# Patient Record
Sex: MALE | Race: OTHER | HISPANIC OR LATINO | Employment: FULL TIME | URBAN - METROPOLITAN AREA
[De-identification: names, ages, dates, MRNs, and addresses within clinical notes are randomized per-mention and may not be internally consistent; named-entity substitution may affect disease eponyms.]

---

## 2024-10-23 ENCOUNTER — HOSPITAL ENCOUNTER (EMERGENCY)
Facility: HOSPITAL | Age: 39
Discharge: HOME | End: 2024-10-23
Attending: EMERGENCY MEDICINE
Payer: COMMERCIAL

## 2024-10-23 VITALS
DIASTOLIC BLOOD PRESSURE: 67 MMHG | SYSTOLIC BLOOD PRESSURE: 124 MMHG | OXYGEN SATURATION: 99 % | TEMPERATURE: 99 F | HEIGHT: 63 IN | WEIGHT: 195 LBS | BODY MASS INDEX: 34.55 KG/M2 | HEART RATE: 57 BPM | RESPIRATION RATE: 19 BRPM

## 2024-10-23 DIAGNOSIS — R09.1 PLEURISY: Primary | ICD-10-CM

## 2024-10-23 DIAGNOSIS — R07.9 CHEST PAIN: ICD-10-CM

## 2024-10-23 DIAGNOSIS — D64.9 ANEMIA, UNSPECIFIED TYPE: ICD-10-CM

## 2024-10-23 LAB
ALBUMIN SERPL BCP-MCNC: 3.8 G/DL (ref 3.5–5.2)
ALP SERPL-CCNC: 126 U/L (ref 40–150)
ALT SERPL W/O P-5'-P-CCNC: 30 U/L (ref 10–44)
ANION GAP SERPL CALC-SCNC: 10 MMOL/L (ref 8–16)
AST SERPL-CCNC: 21 U/L (ref 10–40)
BASOPHILS # BLD AUTO: 0.04 K/UL (ref 0–0.2)
BASOPHILS NFR BLD: 0.5 % (ref 0–1.9)
BILIRUB SERPL-MCNC: 0.3 MG/DL (ref 0.1–1)
BNP SERPL-MCNC: <10 PG/ML (ref 0–99)
BUN SERPL-MCNC: 14 MG/DL (ref 6–20)
CALCIUM SERPL-MCNC: 9.1 MG/DL (ref 8.7–10.5)
CHLORIDE SERPL-SCNC: 104 MMOL/L (ref 95–110)
CO2 SERPL-SCNC: 26 MMOL/L (ref 23–29)
CREAT SERPL-MCNC: 0.8 MG/DL (ref 0.5–1.4)
D DIMER PPP IA.FEU-MCNC: <0.19 MG/L FEU
DIFFERENTIAL METHOD BLD: ABNORMAL
EOSINOPHIL # BLD AUTO: 0.1 K/UL (ref 0–0.5)
EOSINOPHIL NFR BLD: 1.5 % (ref 0–8)
ERYTHROCYTE [DISTWIDTH] IN BLOOD BY AUTOMATED COUNT: 15 % (ref 11.5–14.5)
EST. GFR  (NO RACE VARIABLE): >60 ML/MIN/1.73 M^2
GLUCOSE SERPL-MCNC: 116 MG/DL (ref 70–110)
HCT VFR BLD AUTO: 37.6 % (ref 40–54)
HGB BLD-MCNC: 12.6 G/DL (ref 14–18)
IMM GRANULOCYTES # BLD AUTO: 0.04 K/UL (ref 0–0.04)
IMM GRANULOCYTES NFR BLD AUTO: 0.5 % (ref 0–0.5)
LYMPHOCYTES # BLD AUTO: 1.8 K/UL (ref 1–4.8)
LYMPHOCYTES NFR BLD: 22.4 % (ref 18–48)
MCH RBC QN AUTO: 28.4 PG (ref 27–31)
MCHC RBC AUTO-ENTMCNC: 33.5 G/DL (ref 32–36)
MCV RBC AUTO: 85 FL (ref 82–98)
MONOCYTES # BLD AUTO: 0.4 K/UL (ref 0.3–1)
MONOCYTES NFR BLD: 5.3 % (ref 4–15)
NEUTROPHILS # BLD AUTO: 5.6 K/UL (ref 1.8–7.7)
NEUTROPHILS NFR BLD: 69.8 % (ref 38–73)
NRBC BLD-RTO: 0 /100 WBC
PLATELET # BLD AUTO: 197 K/UL (ref 150–450)
PMV BLD AUTO: 10.3 FL (ref 9.2–12.9)
POTASSIUM SERPL-SCNC: 3.6 MMOL/L (ref 3.5–5.1)
PROT SERPL-MCNC: 7.1 G/DL (ref 6–8.4)
RBC # BLD AUTO: 4.44 M/UL (ref 4.6–6.2)
SODIUM SERPL-SCNC: 140 MMOL/L (ref 136–145)
TROPONIN I SERPL DL<=0.01 NG/ML-MCNC: 0.02 NG/ML (ref 0–0.03)
TROPONIN I SERPL DL<=0.01 NG/ML-MCNC: 0.02 NG/ML (ref 0–0.03)
WBC # BLD AUTO: 8.05 K/UL (ref 3.9–12.7)

## 2024-10-23 PROCEDURE — 80053 COMPREHEN METABOLIC PANEL: CPT | Mod: ER | Performed by: EMERGENCY MEDICINE

## 2024-10-23 PROCEDURE — 63600175 PHARM REV CODE 636 W HCPCS: Mod: ER | Performed by: EMERGENCY MEDICINE

## 2024-10-23 PROCEDURE — 96374 THER/PROPH/DIAG INJ IV PUSH: CPT | Mod: ER

## 2024-10-23 PROCEDURE — 84484 ASSAY OF TROPONIN QUANT: CPT | Mod: 91,ER | Performed by: EMERGENCY MEDICINE

## 2024-10-23 PROCEDURE — 85025 COMPLETE CBC W/AUTO DIFF WBC: CPT | Mod: ER | Performed by: EMERGENCY MEDICINE

## 2024-10-23 PROCEDURE — 85379 FIBRIN DEGRADATION QUANT: CPT | Mod: ER | Performed by: EMERGENCY MEDICINE

## 2024-10-23 PROCEDURE — 83880 ASSAY OF NATRIURETIC PEPTIDE: CPT | Mod: ER | Performed by: EMERGENCY MEDICINE

## 2024-10-23 PROCEDURE — 99285 EMERGENCY DEPT VISIT HI MDM: CPT | Mod: 25,ER

## 2024-10-23 PROCEDURE — 94761 N-INVAS EAR/PLS OXIMETRY MLT: CPT | Mod: ER

## 2024-10-23 RX ORDER — KETOROLAC TROMETHAMINE 30 MG/ML
30 INJECTION, SOLUTION INTRAMUSCULAR; INTRAVENOUS
Status: COMPLETED | OUTPATIENT
Start: 2024-10-23 | End: 2024-10-23

## 2024-10-23 RX ORDER — IBUPROFEN 600 MG/1
600 TABLET ORAL EVERY 6 HOURS PRN
Qty: 40 TABLET | Refills: 0 | Status: SHIPPED | OUTPATIENT
Start: 2024-10-23 | End: 2024-12-02

## 2024-10-23 RX ADMIN — KETOROLAC TROMETHAMINE 30 MG: 30 INJECTION, SOLUTION INTRAMUSCULAR at 03:10

## 2024-10-23 NOTE — ED PROVIDER NOTES
Emergency Medicine Provider Note - 10/23/2024       History     Chief Complaint   Patient presents with    Chest Pain     Pt is . Began having chest pain around 10 am while working. Pain is sharp on left side of chest, occurs about every 20 min, with a deep breath or with certain movements. Pain does not travel anywhere. Denies cardiac hx or hx of diabetes, htn. Does not taken any medication daily. Denies nausea, vomiting.        Allergies:  Review of patient's allergies indicates:  No Known Allergies     History of Present Illness   HPI    10/23/2024,   The history is provided by the patient    Language assistance with Cheo:  Usama #[id number]    Raymond Nguyen is a 39 y.o. male presenting to the ED for chest pain.  Patient reports that he started having chest pain in the left side of his chest.  It is described as sharp and stabbing.  It is worse when he takes a deep breath.  It is not associated with any nausea, vomiting, shortness of breath, diaphoresis.  It does not radiate to the arm neck or jaw.  Cardiac risk factors include:  Smoking.  Patient denies diabetes, hypertension, hyperlipidemia, coronary artery disease, history of DVT, or history of PE.  Patient denies any immobilization.  There has been no preceding cough, cold, congestion.  Patient denies any sore throat, nausea, vomiting, shortness of breath, cough, dysuria, urgency, frequency, hematuria, melena, hematochezia.      Arrival mode: Police/     PCP: No, Primary Doctor     Past Medical History:  History reviewed. No pertinent past medical history.    Past Surgical History:  Past Surgical History:   Procedure Laterality Date    HAND SURGERY Right          Family History:  No family history on file.    Social History:  Social History     Tobacco Use    Smoking status: Every Day     Types: Cigarettes    Smokeless tobacco: Never   Substance and Sexual Activity    Alcohol use: Not Currently    Drug use: Never    Sexual activity: Not on  file       The Past Medical History, Social History, Surgical History and Family History was reviewed as documented above.     Review of Systems   Review of Systems   Constitutional:  Negative for fever.   Respiratory:  Negative for cough and shortness of breath.    Cardiovascular:  Positive for chest pain (No radiation.   Worse with deep breath.). Negative for palpitations and leg swelling.   Gastrointestinal:  Negative for abdominal pain, blood in stool, nausea and vomiting.   Genitourinary:  Negative for dysuria.   Musculoskeletal:  Negative for back pain.   Neurological:  Negative for weakness.        Physical Exam     Initial Vitals   BP Pulse Resp Temp SpO2   10/23/24 1307 10/23/24 1307 10/23/24 1307 10/23/24 1309 10/23/24 1307   128/70 78 16 98.5 °F (36.9 °C) 98 %      MAP       --                 Physical Exam    Nursing Notes and Vital Signs Reviewed.  Constitutional: Patient is in no apparent distress. Well-developed and well-nourished.  Head: Atraumatic. Normocephalic.  Eyes: PERRL. EOM intact. Conjunctivae are not pale. No scleral icterus.  ENT: Mucous membranes are moist. Oropharynx is clear and symmetric.    Neck: Supple. Full ROM. No lymphadenopathy.  Cardiovascular: Regular rate. Regular rhythm. No murmurs, rubs, or gallops. Distal pulses are 2+ and symmetric.  Pulmonary/Chest: No respiratory distress. Clear to auscultation bilaterally. No wheezing or rales.  Abdominal: Soft and non-distended.  There is no tenderness.  No rebound, guarding, or rigidity. Good bowel sounds.  Musculoskeletal: Moves all extremities. No obvious deformities. No edema. No calf tenderness.  Genitourinary: N/A  Skin: Warm and dry.  Neurological:  Alert, awake, and appropriate.  Normal speech.  No acute focal neurological deficits are appreciated.  Psychiatric: Normal affect. Good eye contact. Appropriate in content.     ED Course   ED Procedures:  Procedures    ED Vital Signs:  Vitals:    10/23/24 1331 10/23/24 1332 10/23/24  1339 10/23/24 1342   BP:  117/67     Pulse: 70  72 67   Resp: 19   19   Temp:       TempSrc:       SpO2: 98%   99%   Weight:       Height:        10/23/24 1403 10/23/24 1431 10/23/24 1432 10/23/24 1503   BP: 118/70  121/69 (!) 118/56   Pulse: 68 62  62   Resp:  19     Temp:       TempSrc:       SpO2: 100% 98%  99%   Weight:       Height:        10/23/24 1504 10/23/24 1518 10/23/24 1533 10/23/24 1603   BP:   113/79 114/71   Pulse: 62 (!) 59 62 (!) 59   Resp: (!) 23 (!) 24  19   Temp:       TempSrc:       SpO2: 99% 100% 100% 96%   Weight:       Height:        10/23/24 1632 10/23/24 1633 10/23/24 1703   BP:  127/72 124/67   Pulse: 61 (!) 59 (!) 57   Resp: (!) 21  19   Temp:      TempSrc:      SpO2: 100% 100% 99%   Weight:      Height:          All Lab Results:  Results for orders placed or performed during the hospital encounter of 10/23/24   CBC auto differential    Collection Time: 10/23/24  2:02 PM   Result Value Ref Range    WBC 8.05 3.90 - 12.70 K/uL    RBC 4.44 (L) 4.60 - 6.20 M/uL    Hemoglobin 12.6 (L) 14.0 - 18.0 g/dL    Hematocrit 37.6 (L) 40.0 - 54.0 %    MCV 85 82 - 98 fL    MCH 28.4 27.0 - 31.0 pg    MCHC 33.5 32.0 - 36.0 g/dL    RDW 15.0 (H) 11.5 - 14.5 %    Platelets 197 150 - 450 K/uL    MPV 10.3 9.2 - 12.9 fL    Immature Granulocytes 0.5 0.0 - 0.5 %    Gran # (ANC) 5.6 1.8 - 7.7 K/uL    Immature Grans (Abs) 0.04 0.00 - 0.04 K/uL    Lymph # 1.8 1.0 - 4.8 K/uL    Mono # 0.4 0.3 - 1.0 K/uL    Eos # 0.1 0.0 - 0.5 K/uL    Baso # 0.04 0.00 - 0.20 K/uL    nRBC 0 0 /100 WBC    Gran % 69.8 38.0 - 73.0 %    Lymph % 22.4 18.0 - 48.0 %    Mono % 5.3 4.0 - 15.0 %    Eosinophil % 1.5 0.0 - 8.0 %    Basophil % 0.5 0.0 - 1.9 %    Differential Method Automated    Comprehensive metabolic panel    Collection Time: 10/23/24  2:02 PM   Result Value Ref Range    Sodium 140 136 - 145 mmol/L    Potassium 3.6 3.5 - 5.1 mmol/L    Chloride 104 95 - 110 mmol/L    CO2 26 23 - 29 mmol/L    Glucose 116 (H) 70 - 110 mg/dL    BUN 14 6  - 20 mg/dL    Creatinine 0.8 0.5 - 1.4 mg/dL    Calcium 9.1 8.7 - 10.5 mg/dL    Total Protein 7.1 6.0 - 8.4 g/dL    Albumin 3.8 3.5 - 5.2 g/dL    Total Bilirubin 0.3 0.1 - 1.0 mg/dL    Alkaline Phosphatase 126 40 - 150 U/L    AST 21 10 - 40 U/L    ALT 30 10 - 44 U/L    eGFR >60.0 >60 mL/min/1.73 m^2    Anion Gap 10 8 - 16 mmol/L   Troponin I #1    Collection Time: 10/23/24  2:02 PM   Result Value Ref Range    Troponin I 0.019 0.000 - 0.026 ng/mL   BNP    Collection Time: 10/23/24  2:02 PM   Result Value Ref Range    BNP <10 0 - 99 pg/mL   D dimer, quantitative    Collection Time: 10/23/24  2:02 PM   Result Value Ref Range    D-Dimer <0.19 <0.50 mg/L FEU   Troponin I #2    Collection Time: 10/23/24  4:29 PM   Result Value Ref Range    Troponin I 0.022 0.000 - 0.026 ng/mL           The EKG was ordered, reviewed, and independently interpreted by the ED provider:      ECG Results              EKG 12-lead (Preliminary result)  Result time 10/23/24 15:49:40      Wet Read by Donna Galvin DO (10/23/24 15:49:40, [facility], Emergency Medicine)    Rate of 70 beats per minute.  Normal sinus rhythm.  Normal axis.  No ST segment elevation.  No STEMI                                    Imaging Results:  Imaging Results              X-Ray Chest AP Portable (Final result)  Result time 10/23/24 14:01:30      Final result by Parveen Gautam MD (10/23/24 14:01:30)                   Impression:      No acute process seen.      Electronically signed by: Parveen Gautam MD  Date:    10/23/2024  Time:    14:01               Narrative:    EXAMINATION:  XR CHEST AP PORTABLE    CLINICAL HISTORY:  Chest pain, unspecified    FINDINGS:  Single view of the chest.  Comparison none    Cardiac silhouette is normal.  The lungs demonstrate no evidence of active disease.  Mild dependent changes.  No evidence of pleural effusion or pneumothorax.  Bones appear intact.                                            The Emergency  Provider reviewed the vital signs and test results, which are outlined above.     ED Discussion   ED Medication(s):  Medications   ketorolac injection 30 mg (30 mg Intravenous Given 10/23/24 1512)       ED Course as of 10/23/24 1718   Wed Oct 23, 2024   1710 Troponin I: 0.022 [LB]   1710 Serial troponin negative.  [LB]      ED Course User Index  [LB] Donna Galvin,                     5:13 PM  Reassessment: Dr. Galvin reassessed the pt.  The pt is resting comfortably and is NAD.  Pt states their sx have improved. Discussed test results, shared treatment plan, specific conditions for return, and the need for f/u.  Answered their questions at this time.  Pt understands and agrees to the plan.  The pt has remained hemodynamically stable through ED course and is stable for discharge.    I discussed with patient and/or family/caretaker that evaluation in the ED does not suggest any emergent or life threatening medical conditions requiring immediate intervention beyond what was provided in the ED, and I believe patient is safe for discharge.  Regardless, an unremarkable evaluation in the ED does not preclude the development or presence of a serious of life threatening condition. As such, patient was instructed to return immediately for any worsening or change in current symptoms.    I have discussed with patient and/or family/caretaker chest pain precautions, specifically to return for worsening chest pain, shortness of breath, fever, or any concern.  I have low suspicion for cardiopulmonary, vascular, infectious, respiratory, or other emergent medical condition based on my evaluation in the ED.    I have low suspicion for cardiopulmonary, vascular, infectious, respiratory, or other emergent medical condition relating to patients chest pain, and based on my evaluation, patient is a low risk chest pain patient and I have scheduled for an outpatient stress test.  I have specifically counseled the patient and/or  family/caretaker that a negative chest pain evaluation in the ED did not demonstrate evidence of recent heart attack.  However, we have not evaluated risk for an impending heart attack or other serious, life threatening condition - this is the purpose of the stress test.  As such, I discussed with the patient that they must return to the ED immediately should they experience any recurrence of their chest pain,  shortness of breath, or symptoms for which they were evaluated in the ED, especially if they occur prior to the stress test.       MIPS Measures     Smoker? Yes     Hypertension: Patient did not have any elevated blood pressures in the Emergency Department.     Medical Decision Making           Additional MDM:   Heart Score:    History:          Slightly suspicious.  ECG:             Normal  Age:               Less than 45 years  Risk factors: no risk factors known  Troponin:       Less than or equal to normal limit  Heart Score = 0             Medical Decision Making  Differential Diagnoses: Based on the available information and the initial assessment, the working DIFFERENTIAL DIAGNOSIS considered during this evaluation included, but not limited to: Acute Coronary Syndrome, Costochondritis, Musculoskeletal Pain, Myocarditis, Pericarditis, Pulmonary Embolism, Pneumothorax, and Unstable Angina    Cardiac Monitor Interpretation:  Independent ED physician interpretation of cardiac monitoring.   Rate: 59  Rhythm: Sinus  Indication: Chest pain.       ED course: Patient placed on monitor.  WBC 8.05.  H/H 12.6/37.6.  Platelet count 197.  Blood glucose 116.  Hepatic enzymes normal.  BNP<10.  D-dimer l< 0.19.  EKG no STEMI.  No ST segment elevation.  No signs of myocarditis or pericarditis.  Chest x-ray shows cardiac silhouette is normal.  No pleural effusions or pneumothorax.  Repeat troponin 0.022.  Outpatient referral for Cardiology.  Discharge instructions were given with language assist as well as indications to  "return emergency department.  All questions answered            Amount and/or Complexity of Data Reviewed  Labs: ordered. Decision-making details documented in ED Course.  Radiology: ordered. Decision-making details documented in ED Course.  ECG/medicine tests: ordered and independent interpretation performed. Decision-making details documented in ED Course.    Risk  Prescription drug management.        Coding    Referrals:  Orders Placed This Encounter   Procedures    Ambulatory referral/consult to Cardiology     Standing Status:   Future     Standing Expiration Date:   11/23/2025     Referral Priority:   Routine     Referral Type:   Consultation     Referral Reason:   Specialty Services Required     Requested Specialty:   Cardiology     Number of Visits Requested:   1       Prescription Management: I performed a review of the patient's current Rx medication list as input by nursing staff.    Patient's Medications   New Prescriptions    IBUPROFEN (ADVIL,MOTRIN) 600 MG TABLET    Take 1 tablet (600 mg total) by mouth every 6 (six) hours as needed for Pain.   Previous Medications    No medications on file   Modified Medications    No medications on file   Discontinued Medications    No medications on file        Discussed case verbally with: N/A      Portions of this note may have been created with voice recognition software. Occasional "wrong-word" or "sound-a-like" substitutions may have occurred due to the inherent limitations of voice recognition software. Please, read the note carefully and recognize, using context, where substitutions have occurred.          Clinical Impression       ICD-10-CM ICD-9-CM   1. Anemia, unspecified type  D64.9 285.9   2. Chest pain  R07.9 786.50        Disposition        Disposition: Discharge to home  Patient condition: Stable    ED Follow-up      Follow-up Information       [facility].    Specialty: Cardiology  Why: Return to emergency department for: Chest pain with " nausea, chest pain with blood in stool, chest pain with breaking out in sweats, chest pain with passing out, severe chest pain, difficulty breathing, or other concerns  Contact information:  [address] [address] [address] [phone]             Alaina Redding NP In 2 days.    Specialty: Family Medicine  Why: To establish primary care  Contact information:  [address] [address] [address]  [phone]                                          Donna Galvin DO  10/23/24 1904

## 2024-10-24 LAB
OHS QRS DURATION: 80 MS
OHS QTC CALCULATION: 427 MS